# Patient Record
(demographics unavailable — no encounter records)

---

## 2024-10-23 NOTE — ASSESSMENT
[FreeTextEntry1] : 19 F w/ childhood asthma (stopped using inhalers ~ age 16), presents today as new patient consultation for chest discomfort and dyspnea on/off for the past year. Will evaluate for asthma  - Start trial of Airsupra PRN. If not covered, pt to let me know and will change to albuterol - Given paucity of symptom frequency, does not require controller inhalers at present - Spirometry next visit - CXR - Cardiology follow up for eval - RTC in 4-6 weeks

## 2024-10-23 NOTE — HISTORY OF PRESENT ILLNESS
[Never] : never [TextBox_4] : 19 F w/ childhood asthma (stopped using inhalers ~ age 16), presents today as new patient consultation for chest discomfort and dyspnea on/off for the past year. She reports that there are no significant triggers. She does not have the symptoms now. Associated with deep breaths. Not positional. Tried tylenol without relief. Didn't try motrin. Seeing cardiology as well. Given her history of asthma, she was referred to pulmonary. No seasonal allergies.

## 2024-10-23 NOTE — REASON FOR VISIT
[Initial] : an initial visit [Asthma] : asthma [Shortness of Breath] : shortness of breath [TextBox_44] : chest pain